# Patient Record
Sex: MALE | Race: WHITE | NOT HISPANIC OR LATINO | Employment: STUDENT | RURAL
[De-identification: names, ages, dates, MRNs, and addresses within clinical notes are randomized per-mention and may not be internally consistent; named-entity substitution may affect disease eponyms.]

---
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
## 2024-05-24 ENCOUNTER — OFFICE VISIT (OUTPATIENT)
Dept: FAMILY MEDICINE | Facility: CLINIC | Age: 19
End: 2024-05-24
Payer: COMMERCIAL

## 2024-05-24 VITALS
DIASTOLIC BLOOD PRESSURE: 81 MMHG | TEMPERATURE: 100 F | HEART RATE: 90 BPM | BODY MASS INDEX: 31.28 KG/M2 | HEIGHT: 62 IN | OXYGEN SATURATION: 96 % | SYSTOLIC BLOOD PRESSURE: 118 MMHG | RESPIRATION RATE: 17 BRPM | WEIGHT: 170 LBS

## 2024-05-24 DIAGNOSIS — R50.9 FEVER, UNSPECIFIED FEVER CAUSE: ICD-10-CM

## 2024-05-24 DIAGNOSIS — J02.9 SORE THROAT: ICD-10-CM

## 2024-05-24 DIAGNOSIS — J32.9 SINUSITIS, UNSPECIFIED CHRONICITY, UNSPECIFIED LOCATION: Primary | ICD-10-CM

## 2024-05-24 LAB
CTP QC/QA: YES
CTP QC/QA: YES
MOLECULAR STREP A: NEGATIVE
SARS-COV-2 RDRP RESP QL NAA+PROBE: NEGATIVE

## 2024-05-24 PROCEDURE — 96372 THER/PROPH/DIAG INJ SC/IM: CPT | Mod: ,,, | Performed by: STUDENT IN AN ORGANIZED HEALTH CARE EDUCATION/TRAINING PROGRAM

## 2024-05-24 PROCEDURE — 87651 STREP A DNA AMP PROBE: CPT | Mod: QW,,, | Performed by: STUDENT IN AN ORGANIZED HEALTH CARE EDUCATION/TRAINING PROGRAM

## 2024-05-24 PROCEDURE — 3008F BODY MASS INDEX DOCD: CPT | Mod: ,,, | Performed by: STUDENT IN AN ORGANIZED HEALTH CARE EDUCATION/TRAINING PROGRAM

## 2024-05-24 PROCEDURE — 3079F DIAST BP 80-89 MM HG: CPT | Mod: ,,, | Performed by: STUDENT IN AN ORGANIZED HEALTH CARE EDUCATION/TRAINING PROGRAM

## 2024-05-24 PROCEDURE — 3074F SYST BP LT 130 MM HG: CPT | Mod: ,,, | Performed by: STUDENT IN AN ORGANIZED HEALTH CARE EDUCATION/TRAINING PROGRAM

## 2024-05-24 PROCEDURE — 87635 SARS-COV-2 COVID-19 AMP PRB: CPT | Mod: QW,,, | Performed by: STUDENT IN AN ORGANIZED HEALTH CARE EDUCATION/TRAINING PROGRAM

## 2024-05-24 PROCEDURE — 1159F MED LIST DOCD IN RCRD: CPT | Mod: ,,, | Performed by: STUDENT IN AN ORGANIZED HEALTH CARE EDUCATION/TRAINING PROGRAM

## 2024-05-24 PROCEDURE — 99214 OFFICE O/P EST MOD 30 MIN: CPT | Mod: 25,,, | Performed by: STUDENT IN AN ORGANIZED HEALTH CARE EDUCATION/TRAINING PROGRAM

## 2024-05-24 RX ORDER — CEFTRIAXONE 500 MG/1
500 INJECTION, POWDER, FOR SOLUTION INTRAMUSCULAR; INTRAVENOUS
Status: COMPLETED | OUTPATIENT
Start: 2024-05-24 | End: 2024-05-24

## 2024-05-24 RX ORDER — PREDNISONE 20 MG/1
20 TABLET ORAL DAILY
Qty: 5 TABLET | Refills: 0 | Status: SHIPPED | OUTPATIENT
Start: 2024-05-24 | End: 2024-05-29

## 2024-05-24 RX ORDER — AMOXICILLIN AND CLAVULANATE POTASSIUM 875; 125 MG/1; MG/1
1 TABLET, FILM COATED ORAL EVERY 12 HOURS
Qty: 20 TABLET | Refills: 0 | Status: SHIPPED | OUTPATIENT
Start: 2024-05-24 | End: 2024-06-03

## 2024-05-24 RX ORDER — PROMETHAZINE HYDROCHLORIDE AND DEXTROMETHORPHAN HYDROBROMIDE 6.25; 15 MG/5ML; MG/5ML
5 SYRUP ORAL EVERY 6 HOURS PRN
Qty: 118 ML | Refills: 0 | Status: SHIPPED | OUTPATIENT
Start: 2024-05-24 | End: 2024-06-03

## 2024-05-24 RX ORDER — DEXAMETHASONE SODIUM PHOSPHATE 4 MG/ML
4 INJECTION, SOLUTION INTRA-ARTICULAR; INTRALESIONAL; INTRAMUSCULAR; INTRAVENOUS; SOFT TISSUE
Status: COMPLETED | OUTPATIENT
Start: 2024-05-24 | End: 2024-05-24

## 2024-05-24 RX ADMIN — DEXAMETHASONE SODIUM PHOSPHATE 4 MG: 4 INJECTION, SOLUTION INTRA-ARTICULAR; INTRALESIONAL; INTRAMUSCULAR; INTRAVENOUS; SOFT TISSUE at 11:05

## 2024-05-24 RX ADMIN — CEFTRIAXONE 500 MG: 500 INJECTION, POWDER, FOR SOLUTION INTRAMUSCULAR; INTRAVENOUS at 11:05

## 2024-05-24 NOTE — PROGRESS NOTES
[facility]    Chief Complaint      Chief Complaint   Patient presents with    Cough    Otalgia     Mostly in the left; right ear is muffled.    Documentation Only     Cough x 1 week ; ears just started this AM; otc ibuprofen used.    Sore Throat     From cough       History of Present Illness      Luis Haynes is a 19 y.o. male with chronic conditions of autism, anxiety,  who presents today for URI symptoms x's 1 week.    Cough  Associated symptoms include ear pain (right ear), a fever and a sore throat. Pertinent negatives include no chest pain, headaches, shortness of breath or wheezing.   Otalgia   Associated symptoms include coughing and a sore throat. Pertinent negatives include no headaches or vomiting.   Sore Throat   Associated symptoms include coughing and ear pain (right ear). Pertinent negatives include no headaches, shortness of breath or vomiting.       Past Medical History:  Past Medical History:   Diagnosis Date    Acne     Anxiety     Autism     Delayed puberty     Short stature disorder        Past Surgical History:   has a past surgical history that includes Tonsillectomy; Adenoidectomy; Tympanostomy tube placement; Surgical removal of pilonidal cyst (N/A, 8/18/2022); Wound debridement (N/A, 11/14/2022); Surgical removal of abscess (N/A, 1/5/2023); and Surgical removal of pilonidal cyst (N/A, 1/5/2023).    Social History:  Social History     Tobacco Use    Smoking status: Never    Smokeless tobacco: Never   Substance Use Topics    Alcohol use: Never    Drug use: Never       I personally reviewed all past medical, surgical, and social.     Review of Systems   Constitutional:  Positive for fever.   HENT:  Positive for ear pain (right ear) and sore throat.    Respiratory:  Positive for cough. Negative for shortness of breath and wheezing.    Cardiovascular:  Negative for chest pain.   Gastrointestinal:  Negative for nausea and vomiting.   Neurological:  Negative for headaches.     "    Medications:  Outpatient Encounter Medications as of 2024   Medication Sig Dispense Refill    amoxicillin-clavulanate 875-125mg (AUGMENTIN) 875-125 mg per tablet Take 1 tablet by mouth every 12 (twelve) hours. for 10 days 20 tablet 0    predniSONE (DELTASONE) 20 MG tablet Take 1 tablet (20 mg total) by mouth once daily. for 5 days 5 tablet 0    promethazine-dextromethorphan (PROMETHAZINE-DM) 6.25-15 mg/5 mL Syrp Take 5 mLs by mouth every 6 (six) hours as needed (cough). 118 mL 0    [DISCONTINUED] HYDROcodone-acetaminophen (NORCO)  mg per tablet Take 1 tablet by mouth every 6 (six) hours as needed for Pain. (Patient not taking: Reported on 3/3/2023) 24 tablet 0    [DISCONTINUED] HYDROcodone-acetaminophen (NORCO) 7.5-325 mg per tablet Take 1 tablet by mouth every 6 (six) hours as needed for Pain. (Patient not taking: Reported on 3/3/2023) 24 tablet 0    [DISCONTINUED] metroNIDAZOLE (FLAGYL) 500 MG tablet Take 1 tablet (500 mg total) by mouth every 12 (twelve) hours. (Patient not taking: Reported on 2022) 20 tablet 0    [] cefTRIAXone injection 500 mg       [] dexAMETHasone injection 4 mg        No facility-administered encounter medications on file as of 2024.       Allergies:  Review of patient's allergies indicates:  No Known Allergies    Health Maintenance:    There is no immunization history on file for this patient.   Health Maintenance   Topic Date Due    Hepatitis C Screening  Never done    HPV Vaccines (1 - Male 3-dose series) Never done    TETANUS VACCINE  Never done    Lipid Panel  Completed        Physical Exam      Vital Signs  Temp: (!) 100.4 °F (38 °C)  Temp Source: Oral  Pulse: 90  Resp: 17  SpO2: 96 %  BP: 118/81  BP Location: Right arm  Patient Position: Sitting  Height and Weight  Height: 5' 2" (157.5 cm)  Weight: 77.1 kg (170 lb)  BSA (Calculated - sq m): 1.84 sq meters  BMI (Calculated): 31.1  Weight in (lb) to have BMI = 25: 136.4]    Physical " Exam  Constitutional:       Appearance: Normal appearance. He is obese. He is ill-appearing.   HENT:      Ears:      Comments: Erythematous TM     Nose: Congestion and rhinorrhea present.   Eyes:      Conjunctiva/sclera: Conjunctivae normal.   Cardiovascular:      Rate and Rhythm: Normal rate and regular rhythm.   Pulmonary:      Effort: Pulmonary effort is normal.      Breath sounds: Normal breath sounds.   Skin:     General: Skin is warm and dry.   Neurological:      General: No focal deficit present.      Mental Status: He is alert and oriented to person, place, and time. Mental status is at baseline.          Laboratory:  CBC:  Recent Labs   Lab 08/16/22  1344   WBC 7.14   RBC 5.87   Hemoglobin 16.1   Hematocrit 47.1   Platelet Count 370   MCV 80.2   MCH 27.4   MCHC 34.2     CMP:  Recent Labs   Lab 08/13/21  0702 09/10/21  0728 11/03/21  0711   Albumin 4.0 3.8 3.9   Total Protein 7.1 7.0 7.1   Alk Phos 321 H 286 329 H   ALT 67 H 87 H 48   AST 42 H 50 H 25   Bilirubin, Total 0.8 0.7 0.9     LIPIDS:  Recent Labs   Lab 08/13/21  0702 09/10/21  0728 11/03/21  0711   Triglycerides 120 108 99     TSH:      A1C:        Assessment/Plan     Luis Haynes is a 19 y.o.male with:    1. Sinusitis, unspecified chronicity, unspecified location  -     dexAMETHasone injection 4 mg  -     cefTRIAXone injection 500 mg  -     amoxicillin-clavulanate 875-125mg (AUGMENTIN) 875-125 mg per tablet; Take 1 tablet by mouth every 12 (twelve) hours. for 10 days  Dispense: 20 tablet; Refill: 0  -     predniSONE (DELTASONE) 20 MG tablet; Take 1 tablet (20 mg total) by mouth once daily. for 5 days  Dispense: 5 tablet; Refill: 0  -     promethazine-dextromethorphan (PROMETHAZINE-DM) 6.25-15 mg/5 mL Syrp; Take 5 mLs by mouth every 6 (six) hours as needed (cough).  Dispense: 118 mL; Refill: 0    2. Fever, unspecified fever cause  -     POCT COVID-19 Rapid Screening  -     POCT Strep A, Molecular    3. Sore throat  -     POCT Strep A,  Molecular         Chronic conditions status updated as per HPI.  Other than changes above, cont current medications and maintain follow up with specialists.  Return to clinic as needed.     Patient Instructions   Try to thin the mucus.  Drink lots of liquids to stay hydrated.  Use a cool mist humidifier to avoid dry air.  Use saline nose drops or a saline nose rinse to relieve stuffiness.  Wash your hands often. This will help keep others healthy.  Do not smoke or be in smoke-filled places. Avoid things that may cause breathing problems like fumes, pollution, dust, and other common allergens and irritants.  You may want to take medicine like ibuprofen, naproxen, or acetaminophen to help with pain.     Catherine Yanez, FNP-BC  [facility]

## 2024-07-01 ENCOUNTER — OFFICE VISIT (OUTPATIENT)
Dept: FAMILY MEDICINE | Facility: CLINIC | Age: 19
End: 2024-07-01
Payer: COMMERCIAL

## 2024-07-01 VITALS
WEIGHT: 169 LBS | RESPIRATION RATE: 16 BRPM | BODY MASS INDEX: 31.1 KG/M2 | DIASTOLIC BLOOD PRESSURE: 59 MMHG | HEIGHT: 62 IN | OXYGEN SATURATION: 96 % | HEART RATE: 57 BPM | SYSTOLIC BLOOD PRESSURE: 99 MMHG

## 2024-07-01 DIAGNOSIS — Z13.220 SCREENING FOR LIPOID DISORDERS: ICD-10-CM

## 2024-07-01 DIAGNOSIS — Z13.1 SCREENING FOR DIABETES MELLITUS: Primary | ICD-10-CM

## 2024-07-01 LAB
CHOLEST SERPL-MCNC: 110 MG/DL (ref 0–200)
CHOLEST/HDLC SERPL: 3.5 {RATIO}
GLUCOSE SERPL-MCNC: 85 MG/DL (ref 74–106)
HDLC SERPL-MCNC: 31 MG/DL (ref 40–60)
LDLC SERPL CALC-MCNC: 71 MG/DL
LDLC/HDLC SERPL: 2.3 {RATIO}
NONHDLC SERPL-MCNC: 79 MG/DL
TRIGL SERPL-MCNC: 42 MG/DL (ref 35–150)
VLDLC SERPL-MCNC: 8 MG/DL

## 2024-07-01 PROCEDURE — 82947 ASSAY GLUCOSE BLOOD QUANT: CPT | Mod: ,,, | Performed by: CLINICAL MEDICAL LABORATORY

## 2024-07-01 PROCEDURE — 80061 LIPID PANEL: CPT | Mod: ,,, | Performed by: CLINICAL MEDICAL LABORATORY

## 2024-07-01 NOTE — PROGRESS NOTES
Subjective     Patient ID: Luis Haynes is a 19 y.o. male.    Chief Complaint: Healthy You (Z00.0)    HY Routine followup.  No significant interval change.        Review of Systems   Constitutional:  Negative for activity change, appetite change, fatigue, fever and unexpected weight change.   HENT:  Negative for nasal congestion, dental problem, ear pain, hearing loss, mouth sores, nosebleeds, sore throat, tinnitus and voice change.    Eyes:  Negative for pain, discharge and visual disturbance.   Respiratory:  Negative for apnea, choking, chest tightness, shortness of breath and wheezing.    Cardiovascular:  Negative for chest pain, palpitations, leg swelling and claudication.   Gastrointestinal:  Negative for abdominal pain, blood in stool and change in bowel habit.   Endocrine: Negative for polydipsia, polyphagia and polyuria.   Genitourinary:  Negative for bladder incontinence, dysuria, erectile dysfunction, flank pain, genital sores and hematuria.   Musculoskeletal:  Negative for arthralgias, gait problem, neck pain and neck stiffness.   Integumentary:  Negative for rash, wound, mole/lesion, breast mass and breast discharge.   Allergic/Immunologic: Negative for food allergies.   Neurological:  Negative for seizures, syncope, headaches, memory loss and coordination difficulties.   Hematological:  Negative for adenopathy. Does not bruise/bleed easily.   Psychiatric/Behavioral:  Negative for agitation, behavioral problems, confusion, decreased concentration, hallucinations, self-injury, sleep disturbance and suicidal ideas. The patient is not nervous/anxious.    Breast: Negative for mass      Tobacco Use: Low Risk  (7/1/2024)    Patient History     Smoking Tobacco Use: Never     Smokeless Tobacco Use: Never     Passive Exposure: Not on file     Review of patient's allergies indicates:  No Known Allergies  No current outpatient medications  There are no discontinued medications.    Past Medical History:  "  Diagnosis Date    Acne     Anxiety     Autism     Delayed puberty     Short stature disorder      Health Maintenance Topics with due status: Not Due       Topic Last Completion Date    Influenza Vaccine Not Due       There is no immunization history on file for this patient.    Objective     Body mass index is 30.91 kg/m².  Wt Readings from Last 3 Encounters:   07/01/24 76.7 kg (169 lb)   05/24/24 77.1 kg (170 lb)   11/01/23 71.2 kg (157 lb) (54%, Z= 0.11)*     * Growth percentiles are based on CDC (Boys, 2-20 Years) data.     Ht Readings from Last 3 Encounters:   07/01/24 5' 2" (1.575 m)   05/24/24 5' 2" (1.575 m)   11/01/23 5' 2" (1.575 m) (<1%, Z= -2.67)*     * Growth percentiles are based on CDC (Boys, 2-20 Years) data.     BP Readings from Last 3 Encounters:   07/01/24 (!) 99/59   05/24/24 118/81   11/01/23 110/68     Temp Readings from Last 3 Encounters:   05/24/24 (!) 100.4 °F (38 °C) (Oral)   11/01/23 98.8 °F (37.1 °C) (Oral)   04/24/23 98.2 °F (36.8 °C)     Pulse Readings from Last 3 Encounters:   07/01/24 (!) 57   05/24/24 90   11/01/23 90     Resp Readings from Last 3 Encounters:   07/01/24 16   05/24/24 17   11/01/23 16     PF Readings from Last 3 Encounters:   No data found for PF       Physical Exam  Constitutional:       General: He is not in acute distress.     Appearance: Normal appearance.   HENT:      Head: Normocephalic.      Right Ear: Tympanic membrane and ear canal normal.      Left Ear: Tympanic membrane and ear canal normal.      Nose: Nose normal.      Mouth/Throat:      Mouth: Mucous membranes are moist.      Pharynx: No oropharyngeal exudate.   Eyes:      Extraocular Movements: Extraocular movements intact.      Pupils: Pupils are equal, round, and reactive to light.   Cardiovascular:      Rate and Rhythm: Normal rate and regular rhythm.      Heart sounds: No murmur heard.  Pulmonary:      Effort: Pulmonary effort is normal.      Breath sounds: Normal breath sounds. No wheezing. "   Abdominal:      General: Abdomen is flat. Bowel sounds are normal.      Palpations: Abdomen is soft.      Hernia: No hernia is present.   Musculoskeletal:         General: Normal range of motion.      Cervical back: Normal range of motion and neck supple.      Right lower leg: No edema.      Left lower leg: No edema.   Lymphadenopathy:      Cervical: No cervical adenopathy.   Skin:     General: Skin is warm and dry.      Coloration: Skin is not jaundiced.      Findings: No lesion.   Neurological:      General: No focal deficit present.      Mental Status: He is alert and oriented to person, place, and time.      Cranial Nerves: No cranial nerve deficit.      Gait: Gait normal.   Psychiatric:         Mood and Affect: Mood normal.         Behavior: Behavior normal.         Judgment: Judgment normal.         Assessment and Plan     Problem List Items Addressed This Visit    None  Visit Diagnoses       Screening for diabetes mellitus    -  Primary    Relevant Orders    Glucose, Fasting    Screening for lipoid disorders        Relevant Orders    Lipid Panel            Plan:       I have reviewed the medications, allergies, and problem list.     Goal Actions:    What type of visit is the patient here for today?: Healthy You  Does the patient consent to enroll in [facility] Healthy?: Yes  Is this a Wellness Follow Up?: Yes  What is your overall wellness goal? (select at least one): Improve overall health  Choose 3: Lifestyle, Nutrition, Exercise  Lifestyle Actions : Take medications as prescribed  Nurtrition Actions: Eat a well-balanced diet  Exercise Actions: Recommend physical activity 30 minutes per day 3-5 times/week

## 2024-07-02 ENCOUNTER — PATIENT OUTREACH (OUTPATIENT)
Facility: HOSPITAL | Age: 19
End: 2024-07-02
Payer: COMMERCIAL

## 2024-07-02 NOTE — PROGRESS NOTES
Population Health Chart Review & Patient Outreach Details      Further Action Needed If Patient Returns Outreach:            Updates Requested / Reviewed:     []  Care Everywhere    []     []  External Sources ([facility], [facility], [facility], etc.)    [] [facility]   [] [facility]   [] Other:    []  Care Team Updated   []  Removed  or Duplicate Orders   []  Immunization Reconciliation Completed / Queried    [] [place]   [] [place]   [] [place]   [] [place]      Health Maintenance Topics Addressed and Outreach Outcomes / Actions Taken:             Breast Cancer Screening []  Mammogram Order Placed    []  Mammogram Screening Scheduled    []  External Records Requested & Care Team Updated if Applicable    []  External Records Uploaded & Care Team Updated if Applicable    []  Pt Declined Scheduling Mammogram    []  Pt Will Schedule with External Provider / Order Routed & Care Team Updated if Applicable              Cervical Cancer Screening []  Pap Smear Scheduled in Primary Care or OBGYN    []  External Records Requested & Care Team Updated if Applicable       []  External Records Uploaded, Care Team Updated, & History Updated if Applicable    []  Patient Declined Scheduling Pap Smear    []  Patient Will Schedule with External Provider & Care Team Updated if Applicable                  Colorectal Cancer Screening []  Colonoscopy Case Request / Referral / Home Test Order Placed    []  External Records Requested & Care Team Updated if Applicable    []  External Records Uploaded, Care Team Updated, & History Updated if Applicable    []  Patient Declined Completing Colon Cancer Screening    []  Patient Will Schedule with External Provider & Care Team Updated if Applicable    []  Fit Kit Mailed (add the SmartPhrase under additional notes)    []  Reminded Patient to Complete Home Test                Diabetic Eye Exam []  Eye Exam Screening Order Placed    []  Eye Camera Scheduled or Optometry/Ophthalmology Referral  Placed    []  External Records Requested & Care Team Updated if Applicable    []  External Records Uploaded, Care Team Updated, & History Updated if Applicable    []  Patient Declined Scheduling Eye Exam    []  Patient Will Schedule with External Provider & Care Team Updated if Applicable             Blood Pressure Control []  Primary Care Follow Up Visit Scheduled     []  Remote Blood Pressure Reading Captured    []  Patient Declined Remote Reading or Scheduling Appt - Escalated to PCP    []  Patient Will Call Back or Send Portal Message with Reading                 HbA1c & Other Labs []  Overdue Lab(s) Ordered    []  Overdue Lab(s) Scheduled    []  External Records Uploaded & Care Team Updated if Applicable    []  Primary Care Follow Up Visit Scheduled     []  Reminded Patient to Complete A1c Home Test    []  Patient Declined Scheduling Labs or Will Call Back to Schedule    []  Patient Will Schedule with External Provider / Order Routed, & Care Team Updated if Applicable           Primary Care Appointment []  Primary Care Appt Scheduled    []  Patient Declined Scheduling or Will Call Back to Schedule    []  Pt Established with External Provider, Updated Care Team, & Informed Pt to Notify Payor if Applicable           Medication Adherence /    Statin Use []  Primary Care Appointment Scheduled    []  Patient Reminded to  Prescription    []  Patient Declined, Provider Notified if Needed    []  Sent Provider Message to Review to Evaluate Pt for Statin, Add Exclusion Dx Codes, Document   Exclusion in Problem List, Change Statin Intensity Level to Moderate or High Intensity if Applicable                Osteoporosis Screening []  Dexa Order Placed    []  Dexa Appointment Scheduled    []  External Records Requested & Care Team Updated    []  External Records Uploaded, Care Team Updated, & History Updated if Applicable    []  Patient Declined Scheduling Dexa or Will Call Back to Schedule    []  Patient Will Schedule  with External Provider / Order Routed & Care Team Updated if Applicable       Additional Notes:.  Post visit Population Health review of encounter with date of service  7/1/24 with Adrianne.  not All required HY components in encounter. Chart is opened and needs z00.00 or z00.01 as primary dx and cpt for age 19.   Followup appt for: 7/7/25HY

## 2025-06-27 ENCOUNTER — PATIENT OUTREACH (OUTPATIENT)
Facility: HOSPITAL | Age: 20
End: 2025-06-27
Payer: COMMERCIAL

## (undated) DEVICE — GLOVE PROTEXIS PI SYN SURG 6.5

## (undated) DEVICE — GLOVE PROTEXIS PI SYN SURG 7.5

## (undated) DEVICE — COLLECTOR SPECIMEN ANAEROBIC

## (undated) DEVICE — SOL NACL IRR 1000ML BTL

## (undated) DEVICE — DERM CURETTE 5MM DISP

## (undated) DEVICE — GLOVE PROTEXIS PI SYN SURG 6.0

## (undated) DEVICE — Device

## (undated) DEVICE — GAUZE CURAD STRIP .25IN 5YD

## (undated) DEVICE — SUT 2-0 ETHILON 18 FS

## (undated) DEVICE — GLOVE 6.5 PROTEXIS PI BLUE

## (undated) DEVICE — GLOVE 6.0 PROTEXIS PI BLUE

## (undated) DEVICE — GLOVE PROTEXIS PI SYN SURG 8.0

## (undated) DEVICE — SPONGE GAUZE 16PLY 4X4

## (undated) DEVICE — SUT 3-0 VICRYL / SH (J416)

## (undated) DEVICE — SUT ETHILON 3-0 PS2 18 BLK

## (undated) DEVICE — SWAB AEROBIC CULTURETTE

## (undated) DEVICE — KIT BASIC RUSH

## (undated) DEVICE — SPONGE COTTON WOVEN 4X4IN

## (undated) DEVICE — SUT ETHILON 2-0 FS 18IN BLK

## (undated) DEVICE — SYS IRRISEPT 450ML0.05% CHG

## (undated) DEVICE — GOWN NONREINF SET-IN SLV 2XL